# Patient Record
Sex: MALE | Race: WHITE | NOT HISPANIC OR LATINO | Employment: FULL TIME | URBAN - METROPOLITAN AREA
[De-identification: names, ages, dates, MRNs, and addresses within clinical notes are randomized per-mention and may not be internally consistent; named-entity substitution may affect disease eponyms.]

---

## 2024-06-09 ENCOUNTER — OFFICE VISIT (OUTPATIENT)
Dept: URGENT CARE | Facility: CLINIC | Age: 59
End: 2024-06-09
Payer: COMMERCIAL

## 2024-06-09 VITALS
RESPIRATION RATE: 18 BRPM | WEIGHT: 222 LBS | OXYGEN SATURATION: 98 % | SYSTOLIC BLOOD PRESSURE: 128 MMHG | DIASTOLIC BLOOD PRESSURE: 80 MMHG | TEMPERATURE: 96.7 F | HEIGHT: 68 IN | HEART RATE: 90 BPM | BODY MASS INDEX: 33.65 KG/M2

## 2024-06-09 DIAGNOSIS — H61.21 HEARING LOSS OF RIGHT EAR DUE TO CERUMEN IMPACTION: Primary | ICD-10-CM

## 2024-06-09 PROCEDURE — G0382 LEV 3 HOSP TYPE B ED VISIT: HCPCS | Performed by: PHYSICIAN ASSISTANT

## 2024-06-09 RX ORDER — ATORVASTATIN CALCIUM 20 MG/1
20 TABLET, FILM COATED ORAL DAILY
COMMUNITY
Start: 2024-04-30

## 2024-06-09 RX ORDER — DEXTROAMPHETAMINE SACCHARATE, AMPHETAMINE ASPARTATE, DEXTROAMPHETAMINE SULFATE AND AMPHETAMINE SULFATE 2.5; 2.5; 2.5; 2.5 MG/1; MG/1; MG/1; MG/1
1 TABLET ORAL 2 TIMES DAILY
COMMUNITY
Start: 2024-04-26

## 2024-06-09 RX ORDER — LITHIUM CARBONATE 300 MG/1
900 TABLET, FILM COATED, EXTENDED RELEASE ORAL
COMMUNITY
Start: 2024-03-21

## 2024-06-09 RX ORDER — AMLODIPINE BESYLATE 10 MG/1
10 TABLET ORAL DAILY
COMMUNITY
Start: 2024-04-11

## 2024-06-09 RX ORDER — LIOTHYRONINE SODIUM 5 UG/1
5 TABLET ORAL DAILY
COMMUNITY
Start: 2024-05-04

## 2024-06-09 RX ORDER — QUETIAPINE FUMARATE 300 MG/1
600 TABLET, FILM COATED ORAL
COMMUNITY
Start: 2024-03-21

## 2024-06-09 RX ORDER — VORTIOXETINE 20 MG/1
20 TABLET, FILM COATED ORAL DAILY
COMMUNITY
Start: 2024-06-07

## 2024-06-09 RX ORDER — VENLAFAXINE HYDROCHLORIDE 75 MG/1
75 TABLET, EXTENDED RELEASE ORAL
COMMUNITY
Start: 2024-03-22

## 2024-06-09 RX ORDER — LEVOTHYROXINE SODIUM 0.15 MG/1
150 TABLET ORAL DAILY
COMMUNITY
Start: 2024-04-30

## 2024-06-09 NOTE — PROGRESS NOTES
[facility]        NAME: Denny Phillips is a 58 y.o. male  : 1965    MRN: [id number]  DATE: 2024  TIME: 3:53 PM    Assessment and Plan   Hearing loss of right ear due to cerumen impaction [H61.21]  1. Hearing loss of right ear due to cerumen impaction              Patient Instructions       Follow up with PCP in 3-5 days.  Proceed to  ER if symptoms worsen.    If tests have been performed at [facility], our office will contact you with results if changes need to be made to the care plan discussed with you at the visit.  [facility]    Chief Complaint     Chief Complaint   Patient presents with    Earache     X 1 week, wants to check ear, denies fevers         History of Present Illness       Patient presents with 1 week of the right ear feeling blocked and with mildly muffled hearing.  Denies any congestion, runny nose, cough, fevers, ear pain, or drainage from the ears.    Earache   Pertinent negatives include no coughing, ear discharge, rhinorrhea or sore throat.       Review of Systems   Review of Systems   Constitutional:  Negative for chills, fatigue and fever.   HENT:  Positive for ear pain. Negative for congestion, ear discharge, mouth sores, postnasal drip, rhinorrhea, sinus pressure, sinus pain, sore throat and trouble swallowing.    Respiratory:  Negative for cough, chest tightness, shortness of breath and wheezing.    Cardiovascular:  Negative for chest pain and palpitations.   Musculoskeletal:  Negative for arthralgias and myalgias.   Neurological:  Negative for weakness.   Psychiatric/Behavioral:  Negative for confusion.          Current Medications       Current Outpatient Medications:     amLODIPine (NORVASC) 10 mg tablet, Take 10 mg by mouth daily, Disp: , Rfl:     amphetamine-dextroamphetamine (ADDERALL) 10 mg tablet, Take 1 tablet by mouth 2 (two) times a day, Disp: , Rfl:     atorvastatin (LIPITOR) 20 mg tablet, Take 20 mg by mouth  "daily, Disp: , Rfl:     levothyroxine 150 mcg tablet, Take 150 mcg by mouth daily, Disp: , Rfl:     liothyronine (CYTOMEL) 5 mcg tablet, Take 5 mcg by mouth daily, Disp: , Rfl:     lithium carbonate (LITHOBID) 300 mg CR tablet, Take 900 mg by mouth daily at bedtime, Disp: , Rfl:     QUEtiapine (SEROquel) 300 mg tablet, Take 600 mg by mouth daily at bedtime, Disp: , Rfl:     Trintellix 20 MG tablet, Take 20 mg by mouth daily, Disp: , Rfl:     venlafaxine 75 mg 24 hr tablet, Take 75 mg by mouth daily with breakfast, Disp: , Rfl:     Current Allergies     Allergies as of 06/09/2024    (No Known Allergies)            The following portions of the patient's history were reviewed and updated as appropriate: allergies, current medications, past family history, past medical history, past social history, past surgical history and problem list.     No past medical history on file.    No past surgical history on file.    No family history on file.      Medications have been verified.        Objective   /80   Pulse 90   Temp (!) 96.7 °F (35.9 °C)   Resp 18   Ht 5' 8\" (1.727 m)   Wt 101 kg (222 lb)   SpO2 98%   BMI 33.75 kg/m²   No LMP for male patient.       Physical Exam     Physical Exam  Constitutional:       Appearance: Normal appearance.   HENT:      Head: Normocephalic and atraumatic.      Right Ear: Ear canal and external ear normal. There is impacted cerumen.      Left Ear: Tympanic membrane, ear canal and external ear normal.      Ears:      Comments: Right TM unable to be visualized due to impaction     Nose: Nose normal.      Mouth/Throat:      Mouth: Mucous membranes are moist.      Pharynx: Oropharynx is clear.   Musculoskeletal:      Cervical back: No muscular tenderness.   Neurological:      Mental Status: He is alert.   Psychiatric:         Mood and Affect: Mood normal.         Behavior: Behavior normal.           Ear cerumen removal    Date/Time: 6/9/2024 1:30 PM    Performed by: Frederick Raymond " ALENA  Authorized by: Frederick Raymond PA-C  Universal Protocol:  Consent: Verbal consent obtained.  Consent given by: patient  Patient understanding: patient states understanding of the procedure being performed  Patient consent: the patient's understanding of the procedure matches consent given  Procedure consent: procedure consent matches procedure scheduled    Patient location:  Clinic  Procedure details:     Local anesthetic:  None    Location:  R ear    Approach:  External  Post-procedure details:     Complication:  None    Hearing quality:  Improved    Patient tolerance of procedure:  Procedure terminated at patient's request  Comments:      Moderate amounts of cerumen removed from the ear canal but impaction still in place and was unable to completely clear with lavage.  Patient requesting to stop and he will try Debrox drops over-the-counter.  Denied any pain, dizziness, or symptoms throughout procedure.